# Patient Record
Sex: FEMALE | Race: WHITE
[De-identification: names, ages, dates, MRNs, and addresses within clinical notes are randomized per-mention and may not be internally consistent; named-entity substitution may affect disease eponyms.]

---

## 2018-10-19 ENCOUNTER — HOSPITAL ENCOUNTER (EMERGENCY)
Dept: HOSPITAL 47 - EC | Age: 38
Discharge: HOME | End: 2018-10-19
Payer: COMMERCIAL

## 2018-10-19 VITALS
RESPIRATION RATE: 18 BRPM | SYSTOLIC BLOOD PRESSURE: 109 MMHG | HEART RATE: 67 BPM | TEMPERATURE: 99.2 F | DIASTOLIC BLOOD PRESSURE: 83 MMHG

## 2018-10-19 DIAGNOSIS — S70.02XA: ICD-10-CM

## 2018-10-19 DIAGNOSIS — S01.01XA: Primary | ICD-10-CM
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
## 2018-10-19 PROCEDURE — 72125 CT NECK SPINE W/O DYE: CPT

## 2018-10-19 PROCEDURE — 12001 RPR S/N/AX/GEN/TRNK 2.5CM/<: CPT

## 2018-10-19 PROCEDURE — 70450 CT HEAD/BRAIN W/O DYE: CPT

## 2018-10-19 PROCEDURE — 99283 EMERGENCY DEPT VISIT LOW MDM: CPT

## 2018-10-19 NOTE — ED
Fall HPI





- General


Chief Complaint: Fall


Stated Complaint: HEAD INJURY, FELL BACKWARDS DOWN STAIR


Time Seen by Provider: 10/19/18 10:44


Source: patient


Mode of arrival: wheelchair





- History of Present Illness


Initial Comments: 





This is a 38-year-old female to the ER for evaluation.  This patient presents 

today for evaluation regards to fall.  Patient fall from standing, lost her 

balance fell backwards landed on left hip hit her head does have small scalp 

laceration.  No loss conscious no blood thinners.  Patient denies any other 

traumatic injury or pain


MD Complaint: fall


-: hour(s) (1)


Fall From: standing


When Fall Occurred: 1 hour PTA


Fall Witnessed: no


Place Fall Occurred: home


Loss of Consciousness: none


Prolonged Down Time?: no


Symptoms Prior to Fall: none


Location: head (1H scalp laceration)


Location - Extremities: Left: Leg (Contusion left hip, patient is amateur with 

no pain)


Severity: mild


Severity scale (1-10): 1


Context: tripped/slipped


Associated Symptoms: denies





- Related Data


 Home Medications











 Medication  Instructions  Recorded  Confirmed


 


No Known Home Medications  10/19/18 10/19/18











 Allergies











Allergy/AdvReac Type Severity Reaction Status Date / Time


 


No Known Allergies Allergy   Verified 10/19/18 10:10














Review of Systems


ROS Statement: 


Those systems with pertinent positive or pertinent negative responses have been 

documented in the HPI.





ROS Other: All systems not noted in ROS Statement are negative.





Past Medical History


Past Medical History: No Reported History


History of Any Multi-Drug Resistant Organisms: None Reported


Past Surgical History: No Surgical Hx Reported


Past Psychological History: No Psychological Hx Reported


Smoking Status: Never smoker


Past Alcohol Use History: Occasional


Past Drug Use History: None Reported





General Exam


Limitations: no limitations


General appearance: alert, in no apparent distress


Head exam: Present: normocephalic, normal inspection.  Absent: atraumatic (1 cm 

scalp laceration parietal)


Eye exam: Present: normal appearance, PERRL, EOMI.  Absent: scleral icterus, 

conjunctival injection, periorbital swelling


ENT exam: Present: normal exam, mucous membranes moist


Neck exam: Present: normal inspection.  Absent: tenderness, meningismus, 

lymphadenopathy


Respiratory exam: Present: normal lung sounds bilaterally.  Absent: respiratory 

distress, wheezes, rales, rhonchi, stridor


Cardiovascular Exam: Present: regular rate, normal rhythm, normal heart sounds.

  Absent: systolic murmur, diastolic murmur, rubs, gallop, clicks


GI/Abdominal exam: Present: soft, normal bowel sounds.  Absent: distended, 

tenderness, guarding, rebound, rigid


Extremities exam: Present: normal inspection, full ROM, normal capillary 

refill.  Absent: tenderness, pedal edema, joint swelling, calf tenderness


Back exam: Present: normal inspection


Neurological exam: Present: alert, oriented X3, CN II-XII intact


Psychiatric exam: Present: normal affect, normal mood


Skin exam: Present: warm, dry, intact, normal color.  Absent: rash





Course


 Vital Signs











  10/19/18





  10:10


 


Temperature 97.9 F


 


Pulse Rate 91


 


Respiratory 16





Rate 


 


Blood Pressure 131/83


 


O2 Sat by Pulse 96





Oximetry 














- Reevaluation(s)


Reevaluation #1: 





Medical records thoroughly reviewed





Patient has resolution of headaches currently





Patient is able to ambulate without significant difficulty or pain.








Procedures





- Laceration


  ** Laceration #1


Consent Obtained: verbal consent


Time Out Performed: Yes


Indication: laceration


Site: scalp


Size (cm): 1


Description: linear


Pre-repair: wound explored


Type of Sutures: other (staple)


Technique: simple, interrupted


Patient Tolerated Procedure: well





Medical Decision Making





- Medical Decision Making





38 female the ER status post trip and fall, mechanical fall with scalp 

laceration, laceration is repaired with staple, CT brain C-spine negative.  

Patient can be discharged home





- Radiology Data


Radiology results: report reviewed (CT brain C-spine negative for acute disease)

, image reviewed





Disposition


Clinical Impression: 


 Fall, Head injury, Scalp laceration





Disposition: HOME SELF-CARE


Condition: Good


Instructions:  Head Injury (ED), Contusion in Adults (ED)


Is patient prescribed a controlled substance at d/c from ED?: No


Referrals: 


None,Stated [Primary Care Provider] - 1-2 days

## 2019-12-04 ENCOUNTER — HOSPITAL ENCOUNTER (EMERGENCY)
Dept: HOSPITAL 47 - EC | Age: 39
Discharge: HOME | End: 2019-12-04
Payer: COMMERCIAL

## 2019-12-04 VITALS — SYSTOLIC BLOOD PRESSURE: 127 MMHG | TEMPERATURE: 98 F | DIASTOLIC BLOOD PRESSURE: 89 MMHG | HEART RATE: 68 BPM

## 2019-12-04 VITALS — RESPIRATION RATE: 18 BRPM

## 2019-12-04 DIAGNOSIS — K52.9: Primary | ICD-10-CM

## 2019-12-04 LAB
ALBUMIN SERPL-MCNC: 4.8 G/DL (ref 3.5–5)
ALP SERPL-CCNC: 42 U/L (ref 38–126)
ALT SERPL-CCNC: 23 U/L (ref 9–52)
ANION GAP SERPL CALC-SCNC: 12 MMOL/L
AST SERPL-CCNC: 21 U/L (ref 14–36)
BUN SERPL-SCNC: 19 MG/DL (ref 7–17)
CALCIUM SPEC-MCNC: 9.6 MG/DL (ref 8.4–10.2)
CELLS COUNTED: 100
CHLORIDE SERPL-SCNC: 105 MMOL/L (ref 98–107)
CO2 SERPL-SCNC: 24 MMOL/L (ref 22–30)
EOSINOPHIL # BLD MANUAL: 0.32 K/UL (ref 0–0.7)
ERYTHROCYTE [DISTWIDTH] IN BLOOD BY AUTOMATED COUNT: 4.44 M/UL (ref 3.8–5.4)
ERYTHROCYTE [DISTWIDTH] IN BLOOD: 12 % (ref 11.5–15.5)
GLUCOSE SERPL-MCNC: 89 MG/DL (ref 74–99)
HCT VFR BLD AUTO: 39.6 % (ref 34–46)
HGB BLD-MCNC: 13.5 GM/DL (ref 11.4–16)
LYMPHOCYTES # BLD MANUAL: 2.1 K/UL (ref 1–4.8)
MCH RBC QN AUTO: 30.5 PG (ref 25–35)
MCHC RBC AUTO-ENTMCNC: 34.2 G/DL (ref 31–37)
MCV RBC AUTO: 89.2 FL (ref 80–100)
MONOCYTES # BLD MANUAL: 0.42 K/UL (ref 0–1)
NEUTROPHILS NFR BLD MANUAL: 73 %
NEUTS SEG # BLD MANUAL: 7.67 K/UL (ref 1.3–7.7)
PH UR: 6 [PH] (ref 5–8)
PLATELET # BLD AUTO: 179 K/UL (ref 150–450)
POTASSIUM SERPL-SCNC: 3.8 MMOL/L (ref 3.5–5.1)
PROT SERPL-MCNC: 7.9 G/DL (ref 6.3–8.2)
SODIUM SERPL-SCNC: 141 MMOL/L (ref 137–145)
SP GR UR: 1.02 (ref 1–1.03)
UROBILINOGEN UR QL STRIP: <2 MG/DL (ref ?–2)
WBC # BLD AUTO: 10.5 K/UL (ref 3.8–10.6)

## 2019-12-04 PROCEDURE — 74018 RADEX ABDOMEN 1 VIEW: CPT

## 2019-12-04 PROCEDURE — 96374 THER/PROPH/DIAG INJ IV PUSH: CPT

## 2019-12-04 PROCEDURE — 81003 URINALYSIS AUTO W/O SCOPE: CPT

## 2019-12-04 PROCEDURE — 80053 COMPREHEN METABOLIC PANEL: CPT

## 2019-12-04 PROCEDURE — 82272 OCCULT BLD FECES 1-3 TESTS: CPT

## 2019-12-04 PROCEDURE — 99284 EMERGENCY DEPT VISIT MOD MDM: CPT

## 2019-12-04 PROCEDURE — 96361 HYDRATE IV INFUSION ADD-ON: CPT

## 2019-12-04 PROCEDURE — 83690 ASSAY OF LIPASE: CPT

## 2019-12-04 PROCEDURE — 36415 COLL VENOUS BLD VENIPUNCTURE: CPT

## 2019-12-04 PROCEDURE — 81025 URINE PREGNANCY TEST: CPT

## 2019-12-04 PROCEDURE — 85025 COMPLETE CBC W/AUTO DIFF WBC: CPT

## 2019-12-04 NOTE — ED
Nausea/Vomiting/Diarrhea HPI





- General


Chief complaint: Nausea/Vomiting/Diarrhea


Stated complaint: Blood in stool


Time Seen by Provider: 12/04/19 18:48


Source: patient


Mode of arrival: ambulatory


Limitations: no limitations





- History of Present Illness


Initial comments: 


39-year-old female patient presents to the emergency department today for 

evaluation of diarrhea with rectal bleeding.  Patient states she's had diarrhea 

for the last 4 days.  She is reporting 3-4 episodes today of diarrhea contained 

bright red blood.  She is also experiencing mild right upper quadrant abdominal 

discomfort.  States it is a sharp stabbing pain.  Denies any radiation of the 

pain through to her back.  She denies any nausea or vomiting.  Denies fever or 

chills.  Denies any ingestion of questionable foods.  Denies recent antibiotic 

use, recent travel, or sick contacts.  She denies history of rectal bleeding.  

Denies ever having a colonoscopy. Patient denies any recent rash, shortness 

breath, chest pain, numbness, tingling, dizziness, weakness, hematuria, dysuria,

urinary urgency, urinary frequency, headache, visual changes, or any other 

complaints.








- Related Data


                                  Previous Rx's











 Medication  Instructions  Recorded


 


Dicyclomine [Bentyl] 20 mg PO QID #12 tablet 12/04/19











                                    Allergies











Allergy/AdvReac Type Severity Reaction Status Date / Time


 


No Known Allergies Allergy   Verified 12/04/19 17:51














Review of Systems


ROS Statement: 


Those systems with pertinent positive or pertinent negative responses have been 

documented in the HPI.





ROS Other: All systems not noted in ROS Statement are negative.





Past Medical History


Past Medical History: No Reported History


History of Any Multi-Drug Resistant Organisms: None Reported


Past Surgical History: Tubal Ligation


Past Psychological History: No Psychological Hx Reported


Smoking Status: Never smoker


Past Alcohol Use History: Occasional


Past Drug Use History: None Reported





General Exam


Limitations: no limitations


General appearance: alert, in no apparent distress, other (This is a well-

developed, well-nourished adult female patient in no acute distress.  Vital 

signs upon presentation are temperature 98.6F, pulse 60, respirations 20, blood

 pressure 149/89, pulse ox 97% on room air.)


Eye exam: Present: normal appearance, PERRL, EOMI.  Absent: scleral icterus, 

conjunctival injection, periorbital swelling


ENT exam: Present: normal exam, normal oropharynx, mucous membranes moist


Respiratory exam: Present: normal lung sounds bilaterally.  Absent: respiratory 

distress, wheezes, rales, rhonchi, stridor


Cardiovascular Exam: Present: regular rate, normal rhythm, normal heart sounds. 

 Absent: systolic murmur, diastolic murmur, rubs, gallop, clicks


GI/Abdominal exam: Present: soft, normal bowel sounds.  Absent: distended, 

tenderness, guarding, rebound, rigid


Neurological exam: Present: alert, oriented X3, CN II-XII intact


Psychiatric exam: Present: normal affect, normal mood


Skin exam: Present: warm, dry, intact, normal color.  Absent: rash





Course


                                   Vital Signs











  12/04/19 12/04/19 12/04/19





  17:48 18:51 22:04


 


Temperature 98.6 F  98.0 F


 


Pulse Rate 60 75 68


 


Respiratory 20 18 18





Rate   


 


Blood Pressure 149/89 164/100 127/89


 


O2 Sat by Pulse 97 98 100





Oximetry   














Medical Decision Making





- Medical Decision Making


39-year-old female patient presents to the emergency department today for 

evaluation of diarrhea with passage of bright red blood.  Rectal exam was 

performed and showed no evidence for hemorrhoid or fissure.  Occult blood was 

negative.  The remainder of labs are unremarkable with normal white blood cell 

count, normal hemoglobin.  KUB x-ray was obtained and showed no acute abnormalit

ies.  I did discuss findings and results with the patient.  We did discuss 

colitis as a cause for her symptoms.  She is instructed to increase fluids.  

She'll be given a prescription for Bentyl.  She is instructed to follow-up with 

her primary care physician for recheck in 1-2 days.  Return parameters were 

discussed in detail.  She verbalizes understanding and agrees with this plan to








- Lab Data


Result diagrams: 


                                 12/04/19 18:42





                                 12/04/19 18:42


                                   Lab Results











  12/04/19 12/04/19 12/04/19 Range/Units





  18:42 18:42 18:42 


 


WBC  10.5    (3.8-10.6)  k/uL


 


RBC  4.44    (3.80-5.40)  m/uL


 


Hgb  13.5    (11.4-16.0)  gm/dL


 


Hct  39.6    (34.0-46.0)  %


 


MCV  89.2    (80.0-100.0)  fL


 


MCH  30.5    (25.0-35.0)  pg


 


MCHC  34.2    (31.0-37.0)  g/dL


 


RDW  12.0    (11.5-15.5)  %


 


Plt Count  179    (150-450)  k/uL


 


Neutrophils % (Manual)  73    %


 


Lymphocytes % (Manual)  20    %


 


Monocytes % (Manual)  4    %


 


Eosinophils % (Manual)  3    %


 


Neutrophils # (Manual)  7.67    (1.3-7.7)  k/uL


 


Lymphocytes # (Manual)  2.10    (1.0-4.8)  k/uL


 


Monocytes # (Manual)  0.42    (0-1.0)  k/uL


 


Eosinophils # (Manual)  0.32    (0-0.7)  k/uL


 


Nucleated RBCs  0    (0-0)  /100 WBC


 


Manual Slide Review  Performed    


 


RBC Morphology  Normal    


 


Sodium   141   (137-145)  mmol/L


 


Potassium   3.8   (3.5-5.1)  mmol/L


 


Chloride   105   ()  mmol/L


 


Carbon Dioxide   24   (22-30)  mmol/L


 


Anion Gap   12   mmol/L


 


BUN   19 H   (7-17)  mg/dL


 


Creatinine   0.84   (0.52-1.04)  mg/dL


 


Est GFR (CKD-EPI)AfAm   >90   (>60 ml/min/1.73 sqM)  


 


Est GFR (CKD-EPI)NonAf   89   (>60 ml/min/1.73 sqM)  


 


Glucose   89   (74-99)  mg/dL


 


Calcium   9.6   (8.4-10.2)  mg/dL


 


Total Bilirubin   0.4   (0.2-1.3)  mg/dL


 


AST   21   (14-36)  U/L


 


ALT   23   (9-52)  U/L


 


Alkaline Phosphatase   42   ()  U/L


 


Total Protein   7.9   (6.3-8.2)  g/dL


 


Albumin   4.8   (3.5-5.0)  g/dL


 


Lipase   253   ()  U/L


 


Urine Color    Light Yellow  


 


Urine Appearance    Clear  (Clear)  


 


Urine pH    6.0  (5.0-8.0)  


 


Ur Specific Gravity    1.017  (1.001-1.035)  


 


Urine Protein    Negative  (Negative)  


 


Urine Glucose (UA)    Negative  (Negative)  


 


Urine Ketones    Negative  (Negative)  


 


Urine Blood    Negative  (Negative)  


 


Urine Nitrite    Negative  (Negative)  


 


Urine Bilirubin    Negative  (Negative)  


 


Urine Urobilinogen    <2.0  (<2.0)  mg/dL


 


Ur Leukocyte Esterase    Negative  (Negative)  


 


Urine HCG, Qual     (Not Detectd)  


 


Stool Occult Blood     (Negative)  














  12/04/19 12/04/19 Range/Units





  18:42 19:20 


 


WBC    (3.8-10.6)  k/uL


 


RBC    (3.80-5.40)  m/uL


 


Hgb    (11.4-16.0)  gm/dL


 


Hct    (34.0-46.0)  %


 


MCV    (80.0-100.0)  fL


 


MCH    (25.0-35.0)  pg


 


MCHC    (31.0-37.0)  g/dL


 


RDW    (11.5-15.5)  %


 


Plt Count    (150-450)  k/uL


 


Neutrophils % (Manual)    %


 


Lymphocytes % (Manual)    %


 


Monocytes % (Manual)    %


 


Eosinophils % (Manual)    %


 


Neutrophils # (Manual)    (1.3-7.7)  k/uL


 


Lymphocytes # (Manual)    (1.0-4.8)  k/uL


 


Monocytes # (Manual)    (0-1.0)  k/uL


 


Eosinophils # (Manual)    (0-0.7)  k/uL


 


Nucleated RBCs    (0-0)  /100 WBC


 


Manual Slide Review    


 


RBC Morphology    


 


Sodium    (137-145)  mmol/L


 


Potassium    (3.5-5.1)  mmol/L


 


Chloride    ()  mmol/L


 


Carbon Dioxide    (22-30)  mmol/L


 


Anion Gap    mmol/L


 


BUN    (7-17)  mg/dL


 


Creatinine    (0.52-1.04)  mg/dL


 


Est GFR (CKD-EPI)AfAm    (>60 ml/min/1.73 sqM)  


 


Est GFR (CKD-EPI)NonAf    (>60 ml/min/1.73 sqM)  


 


Glucose    (74-99)  mg/dL


 


Calcium    (8.4-10.2)  mg/dL


 


Total Bilirubin    (0.2-1.3)  mg/dL


 


AST    (14-36)  U/L


 


ALT    (9-52)  U/L


 


Alkaline Phosphatase    ()  U/L


 


Total Protein    (6.3-8.2)  g/dL


 


Albumin    (3.5-5.0)  g/dL


 


Lipase    ()  U/L


 


Urine Color    


 


Urine Appearance    (Clear)  


 


Urine pH    (5.0-8.0)  


 


Ur Specific Gravity    (1.001-1.035)  


 


Urine Protein    (Negative)  


 


Urine Glucose (UA)    (Negative)  


 


Urine Ketones    (Negative)  


 


Urine Blood    (Negative)  


 


Urine Nitrite    (Negative)  


 


Urine Bilirubin    (Negative)  


 


Urine Urobilinogen    (<2.0)  mg/dL


 


Ur Leukocyte Esterase    (Negative)  


 


Urine HCG, Qual  Not Detected   (Not Detectd)  


 


Stool Occult Blood   Negative  (Negative)  














- Radiology Data


Radiology results: report reviewed, image reviewed


KUB x-ray was obtained.  Report was reviewed in its entirety.  Impression by Dr. Franks shows overall nonobstructive bowel gas pattern.





Disposition


Clinical Impression: 


 Diarrhea, Rectal bleeding, Colitis





Disposition: HOME SELF-CARE


Condition: Good


Instructions (If sedation given, give patient instructions):  Acute Diarrhea 

(ED), Colitis (ED)


Additional Instructions: 


Take medications as directed.  Increase fluids.  Follow-up through primary care 

physician for recheck in 1-2 days.  Return to the emergency department 

immediately for any new, worsening, or concerning symptoms.


Prescriptions: 


Dicyclomine [Bentyl] 20 mg PO QID #12 tablet


Is patient prescribed a controlled substance at d/c from ED?: No


Referrals: 


Aj Rose DO [Primary Care Provider] - 1-2 days


Time of Disposition: 21:37

## 2019-12-04 NOTE — XR
EXAMINATION TYPE: XR KUB

 

DATE OF EXAM: 12/4/2019 7:44 PM

 

CLINICAL HISTORY:  Diarrhea today and blood in stool.

 

TECHNIQUE:  Two Upright KUB images of the abdomen are obtained.

 

COMPARISON: None.

 

FINDINGS: Scattered gas is seen in non-distended stomach and small bowel loops. Gas is seen in non-di
stended colon. There are Essure radiodense devices in the pelvis presumed within fallopian tubes. Delma
g bases are clear. No pneumoperitoneum. Osseous structures are intact.

 

IMPRESSION: 

 

Overall nonobstructive bowel gas pattern.

## 2019-12-09 ENCOUNTER — HOSPITAL ENCOUNTER (EMERGENCY)
Dept: HOSPITAL 47 - EC | Age: 39
LOS: 1 days | Discharge: HOME | End: 2019-12-10
Payer: COMMERCIAL

## 2019-12-09 VITALS — TEMPERATURE: 97.9 F

## 2019-12-09 DIAGNOSIS — K52.9: Primary | ICD-10-CM

## 2019-12-09 PROCEDURE — 36415 COLL VENOUS BLD VENIPUNCTURE: CPT

## 2019-12-09 PROCEDURE — 85025 COMPLETE CBC W/AUTO DIFF WBC: CPT

## 2019-12-09 PROCEDURE — 83605 ASSAY OF LACTIC ACID: CPT

## 2019-12-09 PROCEDURE — 96361 HYDRATE IV INFUSION ADD-ON: CPT

## 2019-12-09 PROCEDURE — 96375 TX/PRO/DX INJ NEW DRUG ADDON: CPT

## 2019-12-09 PROCEDURE — 99285 EMERGENCY DEPT VISIT HI MDM: CPT

## 2019-12-09 PROCEDURE — 74177 CT ABD & PELVIS W/CONTRAST: CPT

## 2019-12-09 PROCEDURE — 80053 COMPREHEN METABOLIC PANEL: CPT

## 2019-12-09 PROCEDURE — 83690 ASSAY OF LIPASE: CPT

## 2019-12-09 PROCEDURE — 96374 THER/PROPH/DIAG INJ IV PUSH: CPT

## 2019-12-09 PROCEDURE — 82150 ASSAY OF AMYLASE: CPT

## 2019-12-09 PROCEDURE — 81025 URINE PREGNANCY TEST: CPT

## 2019-12-09 PROCEDURE — 81001 URINALYSIS AUTO W/SCOPE: CPT

## 2019-12-09 NOTE — ED
Abdominal Pain HPI





- General


Chief Complaint: Abdominal Pain


Stated Complaint: Abd Pain/Swelling


Time Seen by Provider: 12/09/19 22:52


Source: patient, RN notes reviewed, old records reviewed


Mode of arrival: ambulatory


Limitations: no limitations





- History of Present Illness


Initial Comments: 





This is a 39-year-old female here.  She presents to the ER today for evaluation 

regards right lower quadrant abdominal pain as normal bowel movements.  History 

of recent GI illness with diarrheal illness.  Nausea no vomiting no fevers.  

Patient's abdominal pain is just right lower quadrant no family member for 

similar complaint and herself no significant surgery history


MD Complaint: abdominal pain (Right lower quadrant)


-: week(s)


Location: RLQ


Radiation: RLQ, suprapubic


Migration to: suprapubic


Severity: moderate


Severity scale (1-10): 4


Quality: aching


Consistency: constant, intermittent


Improves With: nothing


Worsens With: nothing


Associated Symptoms: nausea





- Related Data


                                  Previous Rx's











 Medication  Instructions  Recorded


 


Dicyclomine [Bentyl] 20 mg PO QID #12 tablet 12/04/19











                                    Allergies











Allergy/AdvReac Type Severity Reaction Status Date / Time


 


No Known Allergies Allergy   Verified 12/09/19 22:45














Review of Systems


ROS Statement: 


Those systems with pertinent positive or pertinent negative responses have been 

documented in the HPI.





ROS Other: All systems not noted in ROS Statement are negative.





Past Medical History


Past Medical History: No Reported History


History of Any Multi-Drug Resistant Organisms: None Reported


Past Surgical History: Tubal Ligation


Past Psychological History: No Psychological Hx Reported


Smoking Status: Never smoker


Past Alcohol Use History: Occasional


Past Drug Use History: None Reported





General Exam


Limitations: no limitations


General appearance: alert, in no apparent distress


Head exam: Present: atraumatic, normocephalic, normal inspection


Eye exam: Present: normal appearance, PERRL, EOMI.  Absent: scleral icterus, 

conjunctival injection, periorbital swelling


ENT exam: Present: normal exam, mucous membranes moist


Neck exam: Present: normal inspection.  Absent: tenderness, meningismus, 

lymphadenopathy


Respiratory exam: Present: normal lung sounds bilaterally.  Absent: respiratory 

distress, wheezes, rales, rhonchi, stridor


Cardiovascular Exam: Present: regular rate, normal rhythm, normal heart sounds. 

 Absent: systolic murmur, diastolic murmur, rubs, gallop, clicks


GI/Abdominal exam: Present: soft, normal bowel sounds.  Absent: distended, 

tenderness, guarding, rebound, rigid


Extremities exam: Present: normal inspection, full ROM, normal capillary refill.

  Absent: tenderness, pedal edema, joint swelling, calf tenderness


Back exam: Present: normal inspection


Neurological exam: Present: alert, oriented X3, CN II-XII intact


Psychiatric exam: Present: normal affect, normal mood


Skin exam: Present: warm, dry, intact, normal color.  Absent: rash





Course


                                   Vital Signs











  12/09/19





  22:42


 


Temperature 97.9 F


 


Pulse Rate 66


 


Respiratory 16





Rate 


 


Blood Pressure 125/82


 


O2 Sat by Pulse 96





Oximetry 














- Reevaluation(s)


Reevaluation #1: 





12/10/19 00:31


Medical records reviewed


Reevaluation #2: 





12/10/19 01:08


Patient's symptoms are moderately improved.  





Medical Decision Making





- Medical Decision Making





39 is a female with nonspecific abdominal pain and gastritis-type illness.  

Patient has normal CT and lab work here in the urine can be discharged home





- Lab Data


Result diagrams: 


                                 12/09/19 23:20





                                 12/09/19 23:20


                                   Lab Results











  12/09/19 12/09/19 12/09/19 Range/Units





  23:20 23:20 23:20 


 


WBC   8.9   (3.8-10.6)  k/uL


 


RBC   4.18   (3.80-5.40)  m/uL


 


Hgb   12.9   (11.4-16.0)  gm/dL


 


Hct   36.6   (34.0-46.0)  %


 


MCV   87.6   (80.0-100.0)  fL


 


MCH   30.9   (25.0-35.0)  pg


 


MCHC   35.2   (31.0-37.0)  g/dL


 


RDW   12.0   (11.5-15.5)  %


 


Plt Count   147 L   (150-450)  k/uL


 


Neutrophils %   67   %


 


Lymphocytes %   23   %


 


Monocytes %   7   %


 


Eosinophils %   2   %


 


Basophils %   0   %


 


Neutrophils #   5.9   (1.3-7.7)  k/uL


 


Lymphocytes #   2.1   (1.0-4.8)  k/uL


 


Monocytes #   0.6   (0-1.0)  k/uL


 


Eosinophils #   0.1   (0-0.7)  k/uL


 


Basophils #   0.0   (0-0.2)  k/uL


 


Sodium  137    (137-145)  mmol/L


 


Potassium  3.8    (3.5-5.1)  mmol/L


 


Chloride  104    ()  mmol/L


 


Carbon Dioxide  26    (22-30)  mmol/L


 


Anion Gap  7    mmol/L


 


BUN  17    (7-17)  mg/dL


 


Creatinine  0.79    (0.52-1.04)  mg/dL


 


Est GFR (CKD-EPI)AfAm  >90    (>60 ml/min/1.73 sqM)  


 


Est GFR (CKD-EPI)NonAf  >90    (>60 ml/min/1.73 sqM)  


 


Glucose  93    (74-99)  mg/dL


 


Plasma Lactic Acid Parveen     (0.7-2.0)  mmol/L


 


Calcium  9.2    (8.4-10.2)  mg/dL


 


Total Bilirubin  0.5    (0.2-1.3)  mg/dL


 


AST  17    (14-36)  U/L


 


ALT  19    (9-52)  U/L


 


Alkaline Phosphatase  36 L    ()  U/L


 


Total Protein  7.1    (6.3-8.2)  g/dL


 


Albumin  4.3    (3.5-5.0)  g/dL


 


Amylase  38    ()  U/L


 


Lipase  217    ()  U/L


 


Urine Color     


 


Urine Appearance     (Clear)  


 


Urine pH     (5.0-8.0)  


 


Ur Specific Gravity     (1.001-1.035)  


 


Urine Protein     (Negative)  


 


Urine Glucose (UA)     (Negative)  


 


Urine Ketones     (Negative)  


 


Urine Blood     (Negative)  


 


Urine Nitrite     (Negative)  


 


Urine Bilirubin     (Negative)  


 


Urine Urobilinogen     (<2.0)  mg/dL


 


Ur Leukocyte Esterase     (Negative)  


 


Urine RBC     (0-5)  /hpf


 


Urine WBC     (0-5)  /hpf


 


Ur Squamous Epith Cells     (0-4)  /hpf


 


Urine Mucus     (None)  /hpf


 


Urine HCG, Qual    Not Detected  (Not Detectd)  














  12/09/19 12/09/19 Range/Units





  23:20 23:20 


 


WBC    (3.8-10.6)  k/uL


 


RBC    (3.80-5.40)  m/uL


 


Hgb    (11.4-16.0)  gm/dL


 


Hct    (34.0-46.0)  %


 


MCV    (80.0-100.0)  fL


 


MCH    (25.0-35.0)  pg


 


MCHC    (31.0-37.0)  g/dL


 


RDW    (11.5-15.5)  %


 


Plt Count    (150-450)  k/uL


 


Neutrophils %    %


 


Lymphocytes %    %


 


Monocytes %    %


 


Eosinophils %    %


 


Basophils %    %


 


Neutrophils #    (1.3-7.7)  k/uL


 


Lymphocytes #    (1.0-4.8)  k/uL


 


Monocytes #    (0-1.0)  k/uL


 


Eosinophils #    (0-0.7)  k/uL


 


Basophils #    (0-0.2)  k/uL


 


Sodium    (137-145)  mmol/L


 


Potassium    (3.5-5.1)  mmol/L


 


Chloride    ()  mmol/L


 


Carbon Dioxide    (22-30)  mmol/L


 


Anion Gap    mmol/L


 


BUN    (7-17)  mg/dL


 


Creatinine    (0.52-1.04)  mg/dL


 


Est GFR (CKD-EPI)AfAm    (>60 ml/min/1.73 sqM)  


 


Est GFR (CKD-EPI)NonAf    (>60 ml/min/1.73 sqM)  


 


Glucose    (74-99)  mg/dL


 


Plasma Lactic Acid Parveen  0.6 L   (0.7-2.0)  mmol/L


 


Calcium    (8.4-10.2)  mg/dL


 


Total Bilirubin    (0.2-1.3)  mg/dL


 


AST    (14-36)  U/L


 


ALT    (9-52)  U/L


 


Alkaline Phosphatase    ()  U/L


 


Total Protein    (6.3-8.2)  g/dL


 


Albumin    (3.5-5.0)  g/dL


 


Amylase    ()  U/L


 


Lipase    ()  U/L


 


Urine Color   Light Yellow  


 


Urine Appearance   Cloudy H  (Clear)  


 


Urine pH   6.0  (5.0-8.0)  


 


Ur Specific Gravity   1.015  (1.001-1.035)  


 


Urine Protein   Negative  (Negative)  


 


Urine Glucose (UA)   Negative  (Negative)  


 


Urine Ketones   Negative  (Negative)  


 


Urine Blood   Negative  (Negative)  


 


Urine Nitrite   Negative  (Negative)  


 


Urine Bilirubin   Negative  (Negative)  


 


Urine Urobilinogen   <2.0  (<2.0)  mg/dL


 


Ur Leukocyte Esterase   Negative  (Negative)  


 


Urine RBC   1  (0-5)  /hpf


 


Urine WBC   1  (0-5)  /hpf


 


Ur Squamous Epith Cells   4  (0-4)  /hpf


 


Urine Mucus   Rare H  (None)  /hpf


 


Urine HCG, Qual    (Not Detectd)  














- Radiology Data


Radiology results: report reviewed (CT of the abdomen pelvis is negative for 

acute disease), image reviewed





Disposition


Clinical Impression: 


 Diarrhea, Colitis, Abdominal pain





Disposition: HOME SELF-CARE


Condition: Good


Instructions (If sedation given, give patient instructions):  Abdominal Pain 

(ED)


Is patient prescribed a controlled substance at d/c from ED?: No


Referrals: 


Aj Rose DO [Primary Care Provider] - 1-2 days

## 2019-12-10 VITALS — SYSTOLIC BLOOD PRESSURE: 116 MMHG | DIASTOLIC BLOOD PRESSURE: 82 MMHG | RESPIRATION RATE: 18 BRPM | HEART RATE: 57 BPM

## 2019-12-10 LAB
ALBUMIN SERPL-MCNC: 4.3 G/DL (ref 3.5–5)
ALP SERPL-CCNC: 36 U/L (ref 38–126)
ALT SERPL-CCNC: 19 U/L (ref 9–52)
AMYLASE SERPL-CCNC: 38 U/L (ref 30–110)
ANION GAP SERPL CALC-SCNC: 7 MMOL/L
AST SERPL-CCNC: 17 U/L (ref 14–36)
BASOPHILS # BLD AUTO: 0 K/UL (ref 0–0.2)
BASOPHILS NFR BLD AUTO: 0 %
BUN SERPL-SCNC: 17 MG/DL (ref 7–17)
CALCIUM SPEC-MCNC: 9.2 MG/DL (ref 8.4–10.2)
CHLORIDE SERPL-SCNC: 104 MMOL/L (ref 98–107)
CO2 SERPL-SCNC: 26 MMOL/L (ref 22–30)
EOSINOPHIL # BLD AUTO: 0.1 K/UL (ref 0–0.7)
EOSINOPHIL NFR BLD AUTO: 2 %
ERYTHROCYTE [DISTWIDTH] IN BLOOD BY AUTOMATED COUNT: 4.18 M/UL (ref 3.8–5.4)
ERYTHROCYTE [DISTWIDTH] IN BLOOD: 12 % (ref 11.5–15.5)
GLUCOSE SERPL-MCNC: 93 MG/DL (ref 74–99)
HCT VFR BLD AUTO: 36.6 % (ref 34–46)
HGB BLD-MCNC: 12.9 GM/DL (ref 11.4–16)
LYMPHOCYTES # SPEC AUTO: 2.1 K/UL (ref 1–4.8)
LYMPHOCYTES NFR SPEC AUTO: 23 %
MCH RBC QN AUTO: 30.9 PG (ref 25–35)
MCHC RBC AUTO-ENTMCNC: 35.2 G/DL (ref 31–37)
MCV RBC AUTO: 87.6 FL (ref 80–100)
MONOCYTES # BLD AUTO: 0.6 K/UL (ref 0–1)
MONOCYTES NFR BLD AUTO: 7 %
NEUTROPHILS # BLD AUTO: 5.9 K/UL (ref 1.3–7.7)
NEUTROPHILS NFR BLD AUTO: 67 %
PH UR: 6 [PH] (ref 5–8)
PLATELET # BLD AUTO: 147 K/UL (ref 150–450)
POTASSIUM SERPL-SCNC: 3.8 MMOL/L (ref 3.5–5.1)
PROT SERPL-MCNC: 7.1 G/DL (ref 6.3–8.2)
RBC UR QL: 1 /HPF (ref 0–5)
SODIUM SERPL-SCNC: 137 MMOL/L (ref 137–145)
SP GR UR: 1.01 (ref 1–1.03)
SQUAMOUS UR QL AUTO: 4 /HPF (ref 0–4)
UROBILINOGEN UR QL STRIP: <2 MG/DL (ref ?–2)
WBC # BLD AUTO: 8.9 K/UL (ref 3.8–10.6)

## 2019-12-10 NOTE — CT
EXAMINATION TYPE: CT abdomen pelvis w con

 

DATE OF EXAM: 12/10/2019

 

COMPARISON: None

 

HISTORY: Patient presents with right sided abdominal pain.

 

CT DLP: 749 mGycm

Automated exposure control for dose reduction was used.

 

CONTRAST: 

Performed with IV Contrast, patient injected with 100mL mL of Isovue 300.

Lung bases are clear. There is no pleural effusion. Heart size is normal.

 

Stomach is intact. Liver spleen pancreas gallbladder appear normal. Bile ducts are not dilated.

 

There is no adrenal mass. Kidneys show satisfactory contrast opacification. There is no hydronephrosi
s. There is no retroperitoneal adenopathy. Bladder distends smoothly. There is no inguinal hernia. Th
ere are clips from tubal ablation.

 

There is no free fluid in the pelvis. Uterus is anteverted. There is no mesenteric edema. There is no
 ascites or free air. There is 5 mm cyst in the lateral left kidney.

 

There is no sign of a bowel obstruction. The bony pelvis is intact. Lumbar spine is intact. There is 
no compression fracture. There is no evidence of thickened appendix. Appendix is not definitely seen.


 

IMPRESSION:

Negative CT scan abdomen and pelvis. I do not see a cause for right-sided pain.

## 2020-05-24 ENCOUNTER — HOSPITAL ENCOUNTER (EMERGENCY)
Dept: HOSPITAL 47 - EC | Age: 40
Discharge: HOME | End: 2020-05-24
Payer: COMMERCIAL

## 2020-05-24 VITALS
RESPIRATION RATE: 18 BRPM | TEMPERATURE: 98.6 F | HEART RATE: 77 BPM | SYSTOLIC BLOOD PRESSURE: 147 MMHG | DIASTOLIC BLOOD PRESSURE: 87 MMHG
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
## 2020-05-24 DIAGNOSIS — S93.401A: Primary | ICD-10-CM
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
## 2020-05-24 PROCEDURE — 99283 EMERGENCY DEPT VISIT LOW MDM: CPT

## 2020-05-24 NOTE — XR
Right ankle and right foot

 

HISTORY: Trauma and pain

 

3 views of the right ankle and 3 views of the right foot

 

Bone mineralization, joint spaces and alignment are maintained

 

IMPRESSION: No acute fracture or dislocation of the right foot or ankle.

## 2020-05-24 NOTE — ED
Lower Extremity Injury HPI





- General


Chief Complaint: Extremity Injury, Lower


Stated Complaint: Ankle injury


Time Seen by Provider: 05/24/20 17:30


Source: patient


Mode of arrival: ambulatory


Limitations: no limitations





- History of Present Illness


Initial Comments: 


40-year-old female presenting today for chief complaint of right ankle pain 

patient states this morning she was hiking when she slipped of a log rolling her

right ankle inward.  Patient she has been able to weight-bear however is painful

she is no swelling over the lateral aspect of the ankle.  Patient states she had

no pain prior to this injury.  Patient denies any pain at the knee she denies 

falling hitting her head or any other injury.  Patient denies any numbness 

tingling loss of sensation coolness or pallor of the extremity.  She has no 

additional clinical signs upon arrival she appears well-nourished signs of acute

distress.  Patient using crutches on arrival








- Related Data


                                  Previous Rx's











 Medication  Instructions  Recorded


 


Dicyclomine [Bentyl] 20 mg PO QID #12 tablet 12/04/19











                                    Allergies











Allergy/AdvReac Type Severity Reaction Status Date / Time


 


No Known Allergies Allergy   Verified 05/24/20 17:29














Review of Systems


ROS Statement: 


Those systems with pertinent positive or pertinent negative responses have been 

documented in the HPI.





ROS Other: All systems not noted in ROS Statement are negative.





Past Medical History


Past Medical History: No Reported History


History of Any Multi-Drug Resistant Organisms: None Reported


Past Surgical History: Tubal Ligation


Past Psychological History: No Psychological Hx Reported


Smoking Status: Never smoker


Past Alcohol Use History: Occasional


Past Drug Use History: None Reported





General Exam





- General Exam Comments


Initial Comments: 


General:  The patient is awake and alert, in no distress, and does not appear 

acutely ill. 


Eye:  Pupils are equal, round and reactive to light, extra-ocular movements are 

intact.  No nystagmus.  There is normal conjunctiva bilaterally.  No signs of 

icterus.  


Musculoskeletal:  Upon inspection of the right foot and ankle there is soft 

tissue swelling over the lateral malleolus as well as the proximal aspect of the

 foot.  Patient is point localized tenderness over these areas she is no 

tenderness over the forefoot there is no bruising on the plantar aspect.  Normal

 ROM at ankles/feet but with tenderness of the right ankle. Strength preserved, 

Sensation intact proximal little distal to injury site. DP pulses equal 

bilaterally 2+.  


Neurological:  A&O x 3. CN II-XII intact grossly, There are no obvious motor or 

sensory deficits. Coordination appears grossly intact. Speech is normal.


Skin:  Skin is warm and dry and no rashes or lesions are noted. 


Psychiatric:  Cooperative, appropriate mood & affect, normal judgment.  





Limitations: no limitations





Course


                                   Vital Signs











  05/24/20





  17:27


 


Temperature 98.6 F


 


Pulse Rate 77


 


Respiratory 18





Rate 


 


Blood Pressure 147/87


 


O2 Sat by Pulse 99





Oximetry 














Medical Decision Making





- Medical Decision Making


40-year-old feel presented for right ankle pain swelling on examination for an 

ankle x-ray revealed no fracture I did personally review the studies.  Patient 

is able to weight-bear I suspect that this is a sprain however I recommended 

symptoms are persistent for greater than 1 week to follow-up with orthopedic 

surgery however at this time patient can follow-up with primary care provider 

Rice instructions and use crutches for comfort.  Patient is agreeable to this 

Plan and discharge at this time.








Disposition


Clinical Impression: 


 Ankle sprain





Disposition: HOME SELF-CARE


Condition: Good


Instructions (If sedation given, give patient instructions):  Ankle Sprain (ED)


Additional Instructions: 


Please use medication as discussed.  Please follow-up with family doctor in the 

next 2 days. If symptoms persist follow-up with orthopedics otherwise follow-up 

with primary is indicated.  Please return to emergency room if the symptoms 

increase or worsen or for any other concerns.


Is patient prescribed a controlled substance at d/c from ED?: No


Referrals: 


Aj Rose DO [Primary Care Provider] - 1-2 days


Time of Disposition: 18:40

## 2024-01-04 NOTE — CT
D deficiency.  Order placed.    EXAMINATION TYPE: CT brain cspine wo con

 

DATE OF EXAM: 10/19/2018

 

COMPARISON: NONE

 

HISTORY: Fell backwards down stairs

 

CT DLP: 1078.1 mGycm. Automated Exposure Control for Dose Reduction was Utilized.

 

 

TECHNIQUE: CT scan of the head and cervical spine are performed without contrast.

 

FINDINGS:   There is a left posterior parietal scalp laceration with overlying soft tissue swelling a
nd a 4 mm scalp hematoma as well as subcutaneous emphysema. There is no acute intracranial hemorrhage
, mass effect, or midline shift identified.  Hypoattenuation within the right temporal lobe is linear
 on coronal and axial images related to spray artifact from the dense calvarium. The ventricles and s
ulci are within normal limits in size.  The globes are intact and the visualized sinuses are clear.

 

Cervical spine is visualized in its entirety from C1 through upper thoracic levels and demonstrates s
atisfactory alignment without evidence of acute fracture or dislocation.  Prevertebral soft tissue ap
pears within normal limits.  The C1-C2 articulation is unremarkable.  Biapical nodular pleural parenc
hymal thickening and subsegmental atelectasis is seen. Straightening of the usual cervical lordosis l
ikely relates to patient positioning.

 

IMPRESSION:

1. There is no acute fracture or dislocation evident in the cervical spine.

2. No acute intracranial hemorrhage, mass effect, or midline shift is seen.

3. Left posterior parietal scalp contusion and 4 mm hematoma.